# Patient Record
Sex: MALE | Race: BLACK OR AFRICAN AMERICAN | Employment: FULL TIME | URBAN - METROPOLITAN AREA
[De-identification: names, ages, dates, MRNs, and addresses within clinical notes are randomized per-mention and may not be internally consistent; named-entity substitution may affect disease eponyms.]

---

## 2024-01-06 ENCOUNTER — APPOINTMENT (OUTPATIENT)
Dept: GENERAL RADIOLOGY | Age: 19
End: 2024-01-06

## 2024-01-06 ENCOUNTER — HOSPITAL ENCOUNTER (EMERGENCY)
Age: 19
Discharge: HOME | End: 2024-01-06
Attending: EMERGENCY MEDICINE

## 2024-01-06 VITALS
OXYGEN SATURATION: 100 % | HEIGHT: 67 IN | HEART RATE: 65 BPM | BODY MASS INDEX: 17.4 KG/M2 | SYSTOLIC BLOOD PRESSURE: 123 MMHG | WEIGHT: 110.89 LBS | TEMPERATURE: 97.6 F | DIASTOLIC BLOOD PRESSURE: 81 MMHG | RESPIRATION RATE: 15 BRPM

## 2024-01-06 DIAGNOSIS — S60.112A CONTUSION OF LEFT THUMB WITH DAMAGE TO NAIL, INITIAL ENCOUNTER: Primary | ICD-10-CM

## 2024-01-06 DIAGNOSIS — S60.112A SUBUNGUAL HEMATOMA OF LEFT THUMB, INITIAL ENCOUNTER: ICD-10-CM

## 2024-01-06 PROCEDURE — 99283 EMERGENCY DEPT VISIT LOW MDM: CPT

## 2024-01-06 PROCEDURE — 6370000000 HC RX 637 (ALT 250 FOR IP): Performed by: EMERGENCY MEDICINE

## 2024-01-06 PROCEDURE — 73130 X-RAY EXAM OF HAND: CPT

## 2024-01-06 RX ORDER — IBUPROFEN 400 MG/1
400 TABLET ORAL ONCE
Status: COMPLETED | OUTPATIENT
Start: 2024-01-06 | End: 2024-01-06

## 2024-01-06 RX ADMIN — IBUPROFEN 400 MG: 400 TABLET, FILM COATED ORAL at 21:05

## 2024-01-06 ASSESSMENT — PAIN SCALES - GENERAL: PAINLEVEL_OUTOF10: 7

## 2024-01-07 NOTE — DISCHARGE INSTRUCTIONS
Use ice to the affected area.  Avoid heat or heating pads.    Follow-up with orthopedics in 1 to 2 days for reexamination.  Call today for an appointment.    Wear finger splint until seen by orthopedics.    Recommend limited use of the left hand for 1 week.    If condition worsens or new symptoms develop, return immediately to the emergency department.

## 2024-01-07 NOTE — ED TRIAGE NOTES
Patient to ED for left thumb injury.  Patient is alert and oriented with GCS 15.  Patient reports slamming his hand with a car door around 1500 today.  Thumb and hand is wrapped in an ACE wrap on arrival.  Patient denies any other complaints at this time.

## 2024-01-07 NOTE — ED NOTES
D/C: Order noted for d/c. Pt confirmed d/c paperwork  have correct name. Discharge and education instructions reviewed with patient. Teach-back successful.  Pt verbalized understanding. Pt denied questions at this time. No acute distress noted. Patient instructed to follow-up as noted - return to emergency department if symptoms worsen. Patient verbalized understanding. Discharged per EDMD with discharge instructions. Pt discharged to private vehicle. Patient stable upon departure. Thanked patient for choosing [facility] for care.

## 2024-01-07 NOTE — ED PROVIDER NOTES
[facility] EMERGENCY DEPARTMENT  EMERGENCY DEPARTMENT ENCOUNTER      Pt Name: Clark Mccain  MRN: [id number]  Birthdate 2005  Date of evaluation: 1/6/2024  Provider: ASHLEIGH PARISH DO    CHIEF COMPLAINT       Chief Complaint   Patient presents with    Hand Injury     Left thumb injury, slammed in car door.         HISTORY OF PRESENT ILLNESS   (Location/Symptom, Timing/Onset, Context/Setting, Quality, Duration, Modifying Factors, Severity)  Note limiting factors.   Clark Mccain is a 18 y.o. male who presents to the emergency department with a complaint of an injury to the left hand that he sustained at approximately 2:15 PM today.  The patient states that he inadvertently slammed his left thumb in the car door and it completely shot.  He had to open the door to get it out.  He denies any previous injury to the thumb.  No other injuries.  No weakness or numbness.  He does not take any anticoagulants.  He is not diabetic.    Nursing Notes were reviewed.    HPI        REVIEW OF SYSTEMS    (2-9 systems for level 4, 10 or more for level 5)       Constitutional: Negative for fever or chills.     HENT: Negative for rhinorrhea and sore throat.    Eyes: Negative for redness or drainage.     Respiratory: Negative for shortness of breath or dyspnea on exertion.     Cardiovascular: Negative for chest pain.   Gastrointestinal: Negative for abdominal pain.  Negative for vomiting or diarrhea.   Neurological: Negative for headache.    Genitourinary: Negative for flank pain.  Negative for dysuria.  Negative for hematuria.           All systems are reviewed and are negative except for those listed above in the history of present illness and ROS.        PAST MEDICAL HISTORY   History reviewed. No pertinent past medical history.      SURGICAL HISTORY     History reviewed. No pertinent surgical history.      CURRENT MEDICATIONS       Previous Medications    No medications on file       ALLERGIES    CHIEF COMPLAINT:  Glaucoma follow up      HISTORY OF PRESENT ILLNESS: Pt is here for eye exam. Pt is had knee surgery 4 weeks ago.  Pt states she is recovering.  Pt is using timolol qam and latanoprost qhs ou. Pt has missed a few drops because of her knee surgery.     POH: PCIOL OU, yag cap OU, glaucoma suspect    FH:  (-) glaucoma/amd    Tech notes reviewed.    TESTS:   HVF 1/18/18  OD nonspecific changes, stable  OS new nasal defect, (unreliable) 4/16 fixation loss - pt states had a hard time with the test    OCT RNFL 7/25/18  OD 74, wnl  OS 77, wnl    HVF 24-2 2/5/19  OD wnl  OS wnl    OCT RNFL 8/7/19  OD 66, thin sup and inf  OS 63, thin sup and inf    All tests read and interpreted and reviewed with patient    ASSESSMENT/PLAN  1. Glaucoma  - natural history of disease discussed with patient  - IOP back up to low 20's  - Tmax 25, 28  - Target <20  - Pachy thick  - ON appears okay  - OCT RNFL shows progression  - HVF wnl OU  - Drops: continue latanoprost qhs, increase timolol BID. Consider combigan/cosopt if iop still up despite consistent use of drops  - discussed importance of medication compliance    2. DM  - no retinopathy  - tight BG control  - monitor    3. Pseudophakia OU  - doing well  - monitor    Patient's assessment and plan discussed in detail with patient.  All questions answered.    Return in about 3 months (around 11/7/2019) for follow up, hvf 24-2 (OS first). or sooner as needed.

## 2024-01-09 ENCOUNTER — OFFICE VISIT (OUTPATIENT)
Dept: ORTHOPEDIC SURGERY | Age: 19
End: 2024-01-09

## 2024-01-09 VITALS — WEIGHT: 111 LBS | RESPIRATION RATE: 16 BRPM | HEIGHT: 67 IN | BODY MASS INDEX: 17.42 KG/M2

## 2024-01-09 DIAGNOSIS — S60.012A CONTUSION OF LEFT THUMB WITHOUT DAMAGE TO NAIL, INITIAL ENCOUNTER: Primary | ICD-10-CM

## 2024-01-09 PROCEDURE — 99203 OFFICE O/P NEW LOW 30 MIN: CPT | Performed by: ORTHOPAEDIC SURGERY

## 2024-01-09 NOTE — PROGRESS NOTES
CHIEF COMPLAINT: Left hand pain    DATE OF INJURY: 1/6/24    History:   Mr. Mccain 18 y.o. right handed male presents today for first visit for evaluation of a left hand pain.  The patient was referred by [facility].  This is evaluated as a personal. The pain began 3 days ago. He rates pain at 7/10.   There was a history of injury. He slammed his left thumb in a car door.   He was seen in the ER.  X-rays demonstrated no evidence of fracture.  He was put in a thumb splint.  The patient has taken NSAIDs with relief.  The patient has used ice. 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      No past medical history on file.    No past surgical history on file.    No current outpatient medications on file prior to visit.     No current facility-administered medications on file prior to visit.       Allergies   Allergen Reactions    Bee Venom Anaphylaxis     Has EPI Pen for PRN    Penicillins Rash     diffuse rash       Social History     Socioeconomic History    Marital status: Single     Spouse name: Not on file    Number of children: Not on file    Years of education: Not on file    Highest education level: Not on file   Occupational History    Not on file   Tobacco Use    Smoking status: Never    Smokeless tobacco: Never   Substance and Sexual Activity    Alcohol use: Never    Drug use: Never    Sexual activity: Not on file   Other Topics Concern    Not on file   Social History Narrative    Not on file     Social Determinants of Health     Financial Resource Strain: Not on file   Food Insecurity: Not on file   Transportation Needs: Not on file   Physical Activity: Not on file   Stress: Not on file   Social Connections: Not on file   Intimate Partner Violence: Not on file   Housing Stability: Not on file       No family history on file.        Physical Examination:     Mr. Mccain is a pleasant 18 y.o. male who presents today in no acute distress, awake, alert, and oriented.  Resp 16